# Patient Record
Sex: FEMALE | Race: WHITE | NOT HISPANIC OR LATINO | Employment: OTHER | URBAN - METROPOLITAN AREA
[De-identification: names, ages, dates, MRNs, and addresses within clinical notes are randomized per-mention and may not be internally consistent; named-entity substitution may affect disease eponyms.]

---

## 2018-12-03 ENCOUNTER — OFFICE VISIT (OUTPATIENT)
Dept: DERMATOLOGY | Facility: CLINIC | Age: 58
End: 2018-12-03
Payer: COMMERCIAL

## 2018-12-03 DIAGNOSIS — L82.1 SEBORRHEIC KERATOSIS: ICD-10-CM

## 2018-12-03 DIAGNOSIS — D23.9 DERMATOFIBROMA: Primary | ICD-10-CM

## 2018-12-03 DIAGNOSIS — Z13.89 SCREENING FOR SKIN CONDITION: ICD-10-CM

## 2018-12-03 DIAGNOSIS — D22.9 NEVUS: ICD-10-CM

## 2018-12-03 PROCEDURE — 99203 OFFICE O/P NEW LOW 30 MIN: CPT | Performed by: DERMATOLOGY

## 2018-12-03 RX ORDER — METAXALONE 800 MG/1
TABLET ORAL
COMMUNITY
Start: 2018-05-31

## 2018-12-03 RX ORDER — NYSTATIN 100000 [USP'U]/G
POWDER TOPICAL
COMMUNITY
Start: 2016-10-25

## 2018-12-03 RX ORDER — GABAPENTIN 300 MG/1
CAPSULE ORAL
Refills: 3 | COMMUNITY
Start: 2018-10-18

## 2018-12-03 RX ORDER — FERROUS SULFATE 325(65) MG
325 TABLET ORAL
COMMUNITY

## 2018-12-03 RX ORDER — BUTALBITAL, ACETAMINOPHEN AND CAFFEINE 50; 325; 40 MG/1; MG/1; MG/1
TABLET ORAL
COMMUNITY
Start: 2018-05-22

## 2018-12-03 RX ORDER — LANSOPRAZOLE 30 MG/1
CAPSULE, DELAYED RELEASE ORAL
COMMUNITY
Start: 2014-07-16

## 2018-12-03 RX ORDER — MECLIZINE HYDROCHLORIDE 25 MG/1
25 TABLET ORAL 3 TIMES DAILY PRN
COMMUNITY
Start: 2016-08-28

## 2018-12-03 RX ORDER — LANSOPRAZOLE 30 MG/1
30 CAPSULE, DELAYED RELEASE ORAL 2 TIMES DAILY
Refills: 6 | COMMUNITY
Start: 2018-09-02

## 2018-12-03 RX ORDER — VALSARTAN AND HYDROCHLOROTHIAZIDE 320; 25 MG/1; MG/1
1 TABLET, FILM COATED ORAL
COMMUNITY
Start: 2018-10-11

## 2018-12-03 RX ORDER — VALSARTAN 320 MG/1
TABLET ORAL
COMMUNITY
Start: 2014-07-30

## 2018-12-03 RX ORDER — ALBUTEROL SULFATE 2.5 MG/3ML
SOLUTION RESPIRATORY (INHALATION)
COMMUNITY
Start: 2017-09-14

## 2018-12-03 RX ORDER — DICYCLOMINE HCL 20 MG
20 TABLET ORAL EVERY 6 HOURS
COMMUNITY
Start: 2018-04-05

## 2018-12-03 RX ORDER — TRAMADOL HYDROCHLORIDE 50 MG/1
TABLET ORAL
COMMUNITY
Start: 2018-03-20

## 2018-12-03 RX ORDER — BIOTIN 1 MG
1000 TABLET ORAL
COMMUNITY

## 2018-12-03 RX ORDER — LIDOCAINE 50 MG/G
3 PATCH TOPICAL EVERY 24 HOURS
COMMUNITY
Start: 2018-04-05

## 2018-12-03 NOTE — PROGRESS NOTES
[facility] DERMATOLOGY  [address] [place] [address]  [phone]  [phone]     MRN: [id number] : 1960  Encounter: [id number]  Patient Information: Christie Levy  Chief complaint:  Yearly checkup    History of present illness:  A 65-year-old female presents for overall skin check concerned regarding potential skin cancer with a history of breast cancer patient with the lesion on her leg that is been present for a long time  No past medical history on file  No past surgical history on file  Social History   History   Alcohol use Not on file     History   Drug use: Unknown     History   Smoking Status    Former Smoker   Smokeless Tobacco    Never Used     No family history on file  Meds/Allergies   Allergies   Allergen Reactions    Aspirin      Other reaction(s): ASPIRIN (ASPIRIN) (asthma)    Ciprofloxacin     Diphenhydramine      Asthma attack    Erythromycin     Nsaids     Omeprazole      Other reaction(s): HA    Penicillins     Ranitidine      Other reaction(s): HA, itch, diarrhea    Topiramate      Other reaction(s): SOB    Valproic Acid      Other reaction(s): asthma attack       Meds:  Prior to Admission medications    Medication Sig Start Date End Date Taking?  Authorizing Provider   albuterol (2 5 mg/3 mL) 0 083 % nebulizer solution USE 1 VIA VIAL BY NEBULIZATION EVERY 4 HOURS AS NEEDED FOR WHEEZING 17  Yes Historical Provider, MD   Biotin 1000 MCG tablet Take 1,000 mcg by mouth   Yes Historical Provider, MD   butalbital-acetaminophen-caffeine (FIORICET,ESGIC) -40 mg per tablet TAKE 1 TABLET BY MOUTH EVERY 4 HOURS AS NEEDED FOR HEADACHES 18  Yes Historical Provider, MD   Cholecalciferol 2000 units CAPS Take 1 capsule by mouth   Yes Historical Provider, MD   dicyclomine (BENTYL) 20 mg tablet Take 20 mg by mouth every 6 (six) hours 18  Yes Historical Provider, MD   DOCOSAHEXAENOIC ACID PO Take by mouth   Yes Historical Provider, MD   ferrous sulfate 325 (65 Fe) mg tablet Take 325 mg by mouth   Yes Historical Provider, MD   gabapentin (NEURONTIN) 300 mg capsule TAKE 1 CAPSULE BY MOUTH TWICE A DAY X 3 MONTHS (90D) AS DIRECTED 10/18/18  Yes Historical Provider, MD   lansoprazole (PREVACID) 30 mg capsule Take 30 mg by mouth 2 (two) times a day 9/2/18  Yes Historical Provider, MD   lansoprazole (PREVACID) 30 mg capsule Take by mouth 7/16/14  Yes Historical Provider, MD   lidocaine (LIDODERM) 5 % Place 3 patches on the skin every 24 hours 4/5/18  Yes Historical Provider, MD   meclizine (ANTIVERT) 25 mg tablet Take 25 mg by mouth Three times daily as needed 8/28/16  Yes Historical Provider, MD   metaxalone (SKELAXIN) 800 mg tablet TAKE 1 TABLET (800 MG TOTAL) BY MOUTH 3 (THREE) TIMES A DAY AS NEEDED FOR MUSCLE SPASMS  5/31/18  Yes Historical Provider, MD   nystatin (nystatin) powder APPLY TOPICALLY 3 (THREE) TIMES A DAY   APPLY TO AFFECTED AREA OF ABDOMEN 10/25/16  Yes Historical Provider, MD   traMADol (ULTRAM) 50 mg tablet TAKE 1 TABLET BY MOUTH 3 TIMES A DAY 3/20/18  Yes Historical Provider, MD   valsartan (DIOVAN) 320 MG tablet Take by mouth 7/30/14  Yes Historical Provider, MD   valsartan-hydrochlorothiazide (DIOVAN-HCT) 320-25 MG per tablet Take 1 tablet by mouth 10/11/18  Yes Historical Provider, MD       Subjective:     Review of Systems:    General: negative for - chills, fatigue, fever,  weight gain or weight loss  Psychological: negative for - anxiety, behavioral disorder, concentration difficulties, decreased libido, depression, irritability, memory difficulties, mood swings, sleep disturbances or suicidal ideation  ENT: negative for - hearing difficulties , nasal congestion, nasal discharge, oral lesions, sinus pain, sneezing, sore throat  Allergy and Immunology: negative for - hives, insect bite sensitivity,  Hematological and Lymphatic: negative for - bleeding problems, blood clots,bruising, swollen lymph nodes  Endocrine: negative for - hair pattern changes, hot flashes, malaise/lethargy, mood swings, palpitations, polydipsia/polyuria, skin changes, temperature intolerance or unexpected weight change  Respiratory: negative for - cough, hemoptysis, orthopnea, shortness of breath, or wheezing  Cardiovascular: negative for - chest pain, dyspnea on exertion, edema,  Gastrointestinal: negative for - abdominal pain, nausea/vomiting  Genito-Urinary: negative for - dysuria, incontinence, irregular/heavy menses or urinary frequency/urgency  Musculoskeletal: negative for - gait disturbance, joint pain, joint stiffness, joint swelling, muscle pain, muscular weakness  Dermatological:  As in HPI  Neurological: negative for confusion, dizziness, headaches, impaired coordination/balance, memory loss, numbness/tingling, seizures, speech problems, tremors or weakness       Objective: There were no vitals taken for this visit  Physical Exam:    General Appearance:    Alert, cooperative, no distress   Head:    Normocephalic, without obvious abnormality, atraumatic           Skin:   A full skin exam was performed including scalp, head scalp, eyes, ears, nose, lips, neck, chest, axilla, abdomen, back, buttocks, bilateral upper extremities, bilateral lower extremities, hands, feet, fingers, toes, fingernails, and toenails dome-shaped papule with positive pinch sign right leg normal keratotic papules greasy stuck on appearance normal pigmented lesions regular shape and color nothing else remarkable on exam     Assessment:     1  Dermatofibroma     2  Seborrheic keratosis     3  Nevus     4  Screening for skin condition           Plan:   Dermatofibroma no treatment needed patient reassured these are normal growths we sometimes acquire  Seborrheic Keratosis  Patient reasurred these are normal growths we acquire with age no treatment needed    Nevi reviewed the concept of ABCDE and ugly duckling nothing markedly atypical patient reassured  Screening for Dermatologic Disorders: Nothing else of concern noted on complete exam follow up in 1 year       Imani Loco MD  12/3/2018,2:40 PM    Portions of the record may have been created with voice recognition software   Occasional wrong word or "sound a like" substitutions may have occurred due to the inherent limitations of voice recognition software   Read the chart carefully and recognize, using context, where substitutions have occurred

## 2018-12-03 NOTE — PATIENT INSTRUCTIONS
Dermatofibroma no treatment needed patient reassured these are normal growths we sometimes acquire  Seborrheic Keratosis  Patient reasurred these are normal growths we acquire with age no treatment needed    Nevi reviewed the concept of ABCDE and ric duckling nothing markedly atypical patient reassured  Screening for Dermatologic Disorders: Nothing else of concern noted on complete exam follow up in 1 year

## 2019-12-09 ENCOUNTER — OFFICE VISIT (OUTPATIENT)
Dept: DERMATOLOGY | Facility: CLINIC | Age: 59
End: 2019-12-09
Payer: COMMERCIAL

## 2019-12-09 DIAGNOSIS — Z13.89 SCREENING FOR SKIN CONDITION: ICD-10-CM

## 2019-12-09 DIAGNOSIS — D22.9 NEVUS: Primary | ICD-10-CM

## 2019-12-09 DIAGNOSIS — L82.1 SEBORRHEIC KERATOSIS: ICD-10-CM

## 2019-12-09 PROCEDURE — 99213 OFFICE O/P EST LOW 20 MIN: CPT | Performed by: DERMATOLOGY

## 2019-12-09 NOTE — PROGRESS NOTES
[address]  [facility] [address]  [address] [place] [address]  [phone]  [phone]     MRN: [id number] : 1960  Encounter: [id number]  Patient Information: Steven Hernández  Chief complaint:  Yearly skin check    History of present illness:  79-year-old female presents for overall checkup no specific concerns noted no changes lesions noted  History reviewed  No pertinent past medical history  History reviewed  No pertinent surgical history  Social History   Social History     Substance and Sexual Activity   Alcohol Use Not on file     Social History     Substance and Sexual Activity   Drug Use Not on file     Social History     Tobacco Use   Smoking Status Former Smoker   Smokeless Tobacco Never Used     History reviewed  No pertinent family history  Meds/Allergies   Allergies   Allergen Reactions    Aspirin      Other reaction(s): ASPIRIN (ASPIRIN) (asthma)    Ciprofloxacin     Diphenhydramine      Asthma attack    Erythromycin     Nsaids     Omeprazole      Other reaction(s): HA    Penicillins     Ranitidine      Other reaction(s): HA, itch, diarrhea    Topiramate      Other reaction(s): SOB    Valproic Acid      Other reaction(s): asthma attack       Meds:  Prior to Admission medications    Medication Sig Start Date End Date Taking?  Authorizing Provider   albuterol (2 5 mg/3 mL) 0 083 % nebulizer solution USE 1 VIA VIAL BY NEBULIZATION EVERY 4 HOURS AS NEEDED FOR WHEEZING 17  Yes Historical Provider, MD   Biotin 1000 MCG tablet Take 1,000 mcg by mouth   Yes Historical Provider, MD   butalbital-acetaminophen-caffeine (FIORICET,ESGIC) -40 mg per tablet TAKE 1 TABLET BY MOUTH EVERY 4 HOURS AS NEEDED FOR HEADACHES 18  Yes Historical Provider, MD   Cholecalciferol 2000 units CAPS Take 1 capsule by mouth   Yes Historical Provider, MD   dicyclomine (BENTYL) 20 mg tablet Take 20 mg by mouth every 6 (six) hours 18  Yes Historical Provider, MD   ferrous sulfate 325 (65 Fe) mg tablet Take 325 mg by mouth   Yes Historical Provider, MD   gabapentin (NEURONTIN) 300 mg capsule TAKE 1 CAPSULE BY MOUTH TWICE A DAY X 3 MONTHS (90D) AS DIRECTED 10/18/18  Yes Historical Provider, MD   lansoprazole (PREVACID) 30 mg capsule Take 30 mg by mouth 2 (two) times a day 9/2/18  Yes Historical Provider, MD   lidocaine (LIDODERM) 5 % Place 3 patches on the skin every 24 hours 4/5/18  Yes Historical Provider, MD   meclizine (ANTIVERT) 25 mg tablet Take 25 mg by mouth Three times daily as needed 8/28/16  Yes Historical Provider, MD   metaxalone (SKELAXIN) 800 mg tablet TAKE 1 TABLET (800 MG TOTAL) BY MOUTH 3 (THREE) TIMES A DAY AS NEEDED FOR MUSCLE SPASMS  5/31/18  Yes Historical Provider, MD   nystatin (nystatin) powder APPLY TOPICALLY 3 (THREE) TIMES A DAY   APPLY TO AFFECTED AREA OF ABDOMEN 10/25/16  Yes Historical Provider, MD   valsartan-hydrochlorothiazide (DIOVAN-HCT) 320-25 MG per tablet Take 1 tablet by mouth 10/11/18  Yes Historical Provider, MD   DOCOSAHEXAENOIC ACID PO Take by mouth    Historical Provider, MD   lansoprazole (PREVACID) 30 mg capsule Take by mouth 7/16/14   Historical Provider, MD   traMADol (ULTRAM) 50 mg tablet TAKE 1 TABLET BY MOUTH 3 TIMES A DAY 3/20/18   Historical Provider, MD   valsartan (DIOVAN) 320 MG tablet Take by mouth 7/30/14   Historical Provider, MD       Subjective:     Review of Systems:    General: negative for - chills, fatigue, fever,  weight gain or weight loss  Psychological: negative for - anxiety, behavioral disorder, concentration difficulties, decreased libido, depression, irritability, memory difficulties, mood swings, sleep disturbances or suicidal ideation  ENT: negative for - hearing difficulties , nasal congestion, nasal discharge, oral lesions, sinus pain, sneezing, sore throat  Allergy and Immunology: negative for - hives, insect bite sensitivity,  Hematological and Lymphatic: negative for - bleeding problems, blood clots,bruising, swollen lymph nodes  Endocrine: negative for - hair pattern changes, hot flashes, malaise/lethargy, mood swings, palpitations, polydipsia/polyuria, skin changes, temperature intolerance or unexpected weight change  Respiratory: negative for - cough, hemoptysis, orthopnea, shortness of breath, or wheezing  Cardiovascular: negative for - chest pain, dyspnea on exertion, edema,  Gastrointestinal: negative for - abdominal pain, nausea/vomiting  Genito-Urinary: negative for - dysuria, incontinence, irregular/heavy menses or urinary frequency/urgency  Musculoskeletal: negative for - gait disturbance, joint pain, joint stiffness, joint swelling, muscle pain, muscular weakness  Dermatological:  As in HPI  Neurological: negative for confusion, dizziness, headaches, impaired coordination/balance, memory loss, numbness/tingling, seizures, speech problems, tremors or weakness       Objective: There were no vitals taken for this visit  Physical Exam:    General Appearance:    Alert, cooperative, no distress   Head:    Normocephalic, without obvious abnormality, atraumatic           Skin:   A full skin exam was performed including scalp, head scalp, eyes, ears, nose, lips, neck, chest, axilla, abdomen, back, buttocks, bilateral upper extremities, bilateral lower extremities, hands, feet, fingers, toes, fingernails, and toenails normal pigmented lesion regular shape and color normal keratotic papules greasy stuck appearance nothing else remarkable noted on complete exam     Assessment:     1  Nevus     2  Seborrheic keratosis     3  Screening for skin condition           Plan:   Nevi reviewed the concept of ABCDE and ugly duckling nothing markedly atypical patient reassured  Seborrheic Keratosis  Patient reasurred these are normal growths we acquire with age no treatment needed    Screening for Dermatologic Disorders: Nothing else of concern noted on complete exam follow up in 1 year Rodolfo Garcia MD  12/9/2019,2:52 PM    Portions of the record may have been created with voice recognition software   Occasional wrong word or "sound a like" substitutions may have occurred due to the inherent limitations of voice recognition software   Read the chart carefully and recognize, using context, where substitutions have occurred

## 2021-04-12 ENCOUNTER — OFFICE VISIT (OUTPATIENT)
Dept: DERMATOLOGY | Facility: CLINIC | Age: 61
End: 2021-04-12
Payer: COMMERCIAL

## 2021-04-12 VITALS — TEMPERATURE: 96.4 F

## 2021-04-12 DIAGNOSIS — Z13.89 SCREENING FOR SKIN CONDITION: ICD-10-CM

## 2021-04-12 DIAGNOSIS — L82.1 SEBORRHEIC KERATOSIS: ICD-10-CM

## 2021-04-12 DIAGNOSIS — L71.9 ROSACEA: Primary | ICD-10-CM

## 2021-04-12 DIAGNOSIS — D22.9 NEVUS: ICD-10-CM

## 2021-04-12 PROCEDURE — 99213 OFFICE O/P EST LOW 20 MIN: CPT | Performed by: DERMATOLOGY

## 2021-04-12 RX ORDER — OXYCODONE HYDROCHLORIDE 5 MG/1
5 TABLET ORAL EVERY 6 HOURS PRN
COMMUNITY
Start: 2021-02-25 | End: 2021-06-25

## 2021-04-12 RX ORDER — LETROZOLE 2.5 MG/1
2.5 TABLET, FILM COATED ORAL DAILY
COMMUNITY
Start: 2021-02-19

## 2021-04-12 RX ORDER — GABAPENTIN 100 MG/1
CAPSULE ORAL
COMMUNITY
Start: 2021-03-19

## 2021-04-12 NOTE — PATIENT INSTRUCTIONS
Rosacea by history at this point I cannot confirm that diagnosis advised patient if she is going to use the medication which he was given previously by her family physician she needs to use on a daily basis to control this process  Nevi reviewed the concept of ABCDE and ugly duckling nothing markedly atypical patient reassured  Seborrheic Keratosis  Patient reasurred these are normal growths we acquire with age no treatment needed    Screening for Dermatologic Disorders: Nothing else of concern noted on complete exam follow up in 1 year

## 2021-04-12 NOTE — PROGRESS NOTES
[facility] DERMATOLOGY  [address] [place] [address]  [place] [address]  [phone]  [phone]     MRN: [id number] : 1960  Encounter: [id number]  Patient Information: Melissa Mobley  Chief complaint:   Skin Cancer checkup    History of present illness:  70-year-old female without history of skin cancer presents for overall checkup and concerns patient also is concerned regarding facial rash which she gets redness that seems to last for quite a while not present right now patient's family physician thought she had rosacea  No past medical history on file  No past surgical history on file  Social History   Social History     Substance and Sexual Activity   Alcohol Use None     Social History     Substance and Sexual Activity   Drug Use Not on file     Social History     Tobacco Use   Smoking Status Former Smoker   Smokeless Tobacco Never Used     No family history on file  Meds/Allergies   Allergies   Allergen Reactions    Ibuprofen Shortness Of Breath    Aspirin      Other reaction(s): ASPIRIN (ASPIRIN) (asthma)    Ciprofloxacin     Diphenhydramine      Asthma attack    Erythromycin     Nsaids     Omeprazole      Other reaction(s): HA    Penicillins     Ranitidine      Other reaction(s): HA, itch, diarrhea    Topiramate      Other reaction(s): SOB    Valproic Acid      Other reaction(s): asthma attack       Meds:  Prior to Admission medications    Medication Sig Start Date End Date Taking?  Authorizing Provider   albuterol (2 5 mg/3 mL) 0 083 % nebulizer solution USE 1 VIA VIAL BY NEBULIZATION EVERY 4 HOURS AS NEEDED FOR WHEEZING 17  Yes Historical Provider, MD   Biotin 1000 MCG tablet Take 1,000 mcg by mouth   Yes Historical Provider, MD   butalbital-acetaminophen-caffeine (FIORICET,ESGIC) -40 mg per tablet TAKE 1 TABLET BY MOUTH EVERY 4 HOURS AS NEEDED FOR HEADACHES 18  Yes Historical Provider, MD   Cholecalciferol 2000 units CAPS Take 1 capsule by mouth   Yes Historical Provider, MD   dicyclomine (BENTYL) 20 mg tablet Take 20 mg by mouth every 6 (six) hours 4/5/18  Yes Historical Provider, MD   DOCOSAHEXAENOIC ACID PO Take by mouth   Yes Historical Provider, MD   ferrous sulfate 325 (65 Fe) mg tablet Take 325 mg by mouth   Yes Historical Provider, MD   gabapentin (NEURONTIN) 100 mg capsule TAKE 1 2 CAPSULES AT BEDTIME IN ADDITION TO  S 3/19/21  Yes Historical Provider, MD   gabapentin (NEURONTIN) 300 mg capsule TAKE 1 CAPSULE BY MOUTH TWICE A DAY X 3 MONTHS (90D) AS DIRECTED 10/18/18  Yes Historical Provider, MD   lansoprazole (PREVACID) 30 mg capsule Take by mouth 7/16/14  Yes Historical Provider, MD   letrozole [facility]) 2 5 mg tablet Take 2 5 mg by mouth daily 2/19/21  Yes Historical Provider, MD   lidocaine (LIDODERM) 5 % Place 3 patches on the skin every 24 hours 4/5/18  Yes Historical Provider, MD   meclizine (ANTIVERT) 25 mg tablet Take 25 mg by mouth Three times daily as needed 8/28/16  Yes Historical Provider, MD   metaxalone (SKELAXIN) 800 mg tablet TAKE 1 TABLET (800 MG TOTAL) BY MOUTH 3 (THREE) TIMES A DAY AS NEEDED FOR MUSCLE SPASMS  5/31/18  Yes Historical Provider, MD   nystatin (nystatin) powder APPLY TOPICALLY 3 (THREE) TIMES A DAY   APPLY TO AFFECTED AREA OF ABDOMEN 10/25/16  Yes Historical Provider, MD   oxyCODONE (ROXICODONE) 5 mg immediate release tablet Take 5 mg by mouth every 6 (six) hours as needed 2/25/21 6/25/21 Yes Historical Provider, MD   traMADol (ULTRAM) 50 mg tablet TAKE 1 TABLET BY MOUTH 3 TIMES A DAY 3/20/18  Yes Historical Provider, MD   valsartan (DIOVAN) 320 MG tablet Take by mouth 7/30/14  Yes Historical Provider, MD   valsartan-hydrochlorothiazide (DIOVAN-HCT) 320-25 MG per tablet Take 1 tablet by mouth 10/11/18  Yes Historical Provider, MD   lansoprazole (PREVACID) 30 mg capsule Take 30 mg by mouth 2 (two) times a day 9/2/18   Historical Provider, MD       Subjective:     Review of Systems:    General: negative for - chills, fatigue, fever,  weight gain or weight loss  Psychological: negative for - anxiety, behavioral disorder, concentration difficulties, decreased libido, depression, irritability, memory difficulties, mood swings, sleep disturbances or suicidal ideation  ENT: negative for - hearing difficulties , nasal congestion, nasal discharge, oral lesions, sinus pain, sneezing, sore throat  Allergy and Immunology: negative for - hives, insect bite sensitivity,  Hematological and Lymphatic: negative for - bleeding problems, blood clots,bruising, swollen lymph nodes  Endocrine: negative for - hair pattern changes, hot flashes, malaise/lethargy, mood swings, palpitations, polydipsia/polyuria, skin changes, temperature intolerance or unexpected weight change  Respiratory: negative for - cough, hemoptysis, orthopnea, shortness of breath, or wheezing  Cardiovascular: negative for - chest pain, dyspnea on exertion, edema,  Gastrointestinal: negative for - abdominal pain, nausea/vomiting  Genito-Urinary: negative for - dysuria, incontinence, irregular/heavy menses or urinary frequency/urgency  Musculoskeletal: negative for - gait disturbance, joint pain, joint stiffness, joint swelling, muscle pain, muscular weakness  Dermatological:  As in HPI  Neurological: negative for confusion, dizziness, headaches, impaired coordination/balance, memory loss, numbness/tingling, seizures, speech problems, tremors or weakness       Objective:   Temp (!) 96 4 °F (35 8 °C) (Temporal)     Physical Exam:    General Appearance:    Alert, cooperative, no distress   Head:    Normocephalic, without obvious abnormality, atraumatic           Skin:   A full skin exam was performed including scalp, head scalp, eyes, ears, nose, lips, neck, chest, axilla, abdomen, back, buttocks, bilateral upper extremities, bilateral lower extremities, hands, feet, fingers, toes, fingernails, and toenails no active rosacea noted normal pigmented lesions with regular shape and color and else markedly atypical noted exam normal keratotic papules greasy stuck on appearance     Assessment:     1  Rosacea     2  Nevus     3  Seborrheic keratosis     4  Screening for skin condition           Plan:   Rosacea by history at this point I cannot confirm that diagnosis advised patient if she is going to use the medication which he was given previously by her family physician she needs to use on a daily basis to control this process  Nevi reviewed the concept of ABCDE and ugly duckling nothing markedly atypical patient reassured  Seborrheic Keratosis  Patient reasurred these are normal growths we acquire with age no treatment needed  Screening for Dermatologic Disorders: Nothing else of concern noted on complete exam follow up in 1 year       Chinmay Laura MD  4/12/2021,4:27 PM    Portions of the record may have been created with voice recognition software   Occasional wrong word or "sound a like" substitutions may have occurred due to the inherent limitations of voice recognition software   Read the chart carefully and recognize, using context, where substitutions have occurred

## 2022-08-22 ENCOUNTER — OFFICE VISIT (OUTPATIENT)
Dept: DERMATOLOGY | Facility: CLINIC | Age: 62
End: 2022-08-22
Payer: COMMERCIAL

## 2022-08-22 VITALS — WEIGHT: 165 LBS | BODY MASS INDEX: 32.39 KG/M2 | HEIGHT: 60 IN

## 2022-08-22 DIAGNOSIS — L82.1 SEBORRHEIC KERATOSIS: ICD-10-CM

## 2022-08-22 DIAGNOSIS — L71.9 ROSACEA: Primary | ICD-10-CM

## 2022-08-22 DIAGNOSIS — D22.9 NEVUS: ICD-10-CM

## 2022-08-22 DIAGNOSIS — Z13.89 SCREENING FOR SKIN CONDITION: ICD-10-CM

## 2022-08-22 PROCEDURE — 99213 OFFICE O/P EST LOW 20 MIN: CPT | Performed by: DERMATOLOGY

## 2022-08-22 NOTE — PROGRESS NOTES
[facility] DERMATOLOGY  [address] [place] [address]  Jose Henry [address] [place] [id number]  [phone]     MRN: [id number] : 1960  Encounter: [id number]  Patient Information: Christie Levy  Chief complaint:  Yearly checkup    History of present illness:  56-year-old female presents for overall skin check history of rosacea no specific concerns or changes noted patient has not been treating the rosacea recently has not been a problem no other concerns noted  History reviewed  No pertinent past medical history  History reviewed  No pertinent surgical history  Social History   Social History     Substance and Sexual Activity   Alcohol Use None     Social History     Substance and Sexual Activity   Drug Use Not on file     Social History     Tobacco Use   Smoking Status Former Smoker   Smokeless Tobacco Never Used     History reviewed  No pertinent family history  Meds/Allergies   Allergies   Allergen Reactions    Ibuprofen Shortness Of Breath    Aspirin      Other reaction(s): ASPIRIN (ASPIRIN) (asthma)    Ciprofloxacin     Diphenhydramine      Asthma attack    Erythromycin     Nsaids     Omeprazole      Other reaction(s): HA    Penicillins     Ranitidine      Other reaction(s): HA, itch, diarrhea    Topiramate      Other reaction(s): SOB    Valproic Acid      Other reaction(s): asthma attack       Meds:  Prior to Admission medications    Medication Sig Start Date End Date Taking?  Authorizing Provider   albuterol (2 5 mg/3 mL) 0 083 % nebulizer solution USE 1 VIA VIAL BY NEBULIZATION EVERY 4 HOURS AS NEEDED FOR WHEEZING 17  Yes Historical Provider, MD   Biotin 1000 MCG tablet Take 1,000 mcg by mouth   Yes Historical Provider, MD   butalbital-acetaminophen-caffeine (FIORICET,ESGIC) -40 mg per tablet TAKE 1 TABLET BY MOUTH EVERY 4 HOURS AS NEEDED FOR HEADACHES 18  Yes Historical Provider, MD   Cholecalciferol 2000 units CAPS Take 1 capsule by mouth Yes Historical Provider, MD   dicyclomine (BENTYL) 20 mg tablet Take 20 mg by mouth every 6 (six) hours 4/5/18  Yes Historical Provider, MD   DOCOSAHEXAENOIC ACID PO Take by mouth   Yes Historical Provider, MD   ferrous sulfate 325 (65 Fe) mg tablet Take 325 mg by mouth   Yes Historical Provider, MD   lansoprazole (PREVACID) 30 mg capsule Take 30 mg by mouth 2 (two) times a day 9/2/18  Yes Historical Provider, MD   lansoprazole (PREVACID) 30 mg capsule Take by mouth 7/16/14  Yes Historical Provider, MD   letrozole [facility]) 2 5 mg tablet Take 2 5 mg by mouth daily 2/19/21  Yes Historical Provider, MD   lidocaine (LIDODERM) 5 % Place 3 patches on the skin every 24 hours 4/5/18  Yes Historical Provider, MD   meclizine (ANTIVERT) 25 mg tablet Take 25 mg by mouth Three times daily as needed 8/28/16  Yes Historical Provider, MD   metaxalone (SKELAXIN) 800 mg tablet TAKE 1 TABLET (800 MG TOTAL) BY MOUTH 3 (THREE) TIMES A DAY AS NEEDED FOR MUSCLE SPASMS  5/31/18  Yes Historical Provider, MD   valsartan-hydrochlorothiazide (DIOVAN-HCT) 320-25 MG per tablet Take 1 tablet by mouth 10/11/18  Yes Historical Provider, MD   gabapentin (NEURONTIN) 100 mg capsule TAKE 1 2 CAPSULES AT BEDTIME IN ADDITION TO  S  Patient not taking: Reported on 8/22/2022 3/19/21   Historical Provider, MD   gabapentin (NEURONTIN) 300 mg capsule TAKE 1 CAPSULE BY MOUTH TWICE A DAY X 3 MONTHS (90D) AS DIRECTED  Patient not taking: Reported on 8/22/2022 10/18/18   Historical Provider, MD   nystatin (MYCOSTATIN) powder APPLY TOPICALLY 3 (THREE) TIMES A DAY   APPLY TO AFFECTED AREA OF ABDOMEN  Patient not taking: Reported on 8/22/2022 10/25/16   Historical Provider, MD   traMADol (ULTRAM) 50 mg tablet TAKE 1 TABLET BY MOUTH 3 TIMES A DAY  Patient not taking: Reported on 8/22/2022 3/20/18   Historical Provider, MD   valsartan (DIOVAN) 320 MG tablet Take by mouth  Patient not taking: Reported on 8/22/2022 7/30/14   Historical Provider, MD Subjective:     Review of Systems:    General: negative for - chills, fatigue, fever,  weight gain or weight loss  Psychological: negative for - anxiety, behavioral disorder, concentration difficulties, decreased libido, depression, irritability, memory difficulties, mood swings, sleep disturbances or suicidal ideation  ENT: negative for - hearing difficulties , nasal congestion, nasal discharge, oral lesions, sinus pain, sneezing, sore throat  Allergy and Immunology: negative for - hives, insect bite sensitivity,  Hematological and Lymphatic: negative for - bleeding problems, blood clots,bruising, swollen lymph nodes  Endocrine: negative for - hair pattern changes, hot flashes, malaise/lethargy, mood swings, palpitations, polydipsia/polyuria, skin changes, temperature intolerance or unexpected weight change  Respiratory: negative for - cough, hemoptysis, orthopnea, shortness of breath, or wheezing  Cardiovascular: negative for - chest pain, dyspnea on exertion, edema,  Gastrointestinal: negative for - abdominal pain, nausea/vomiting  Genito-Urinary: negative for - dysuria, incontinence, irregular/heavy menses or urinary frequency/urgency  Musculoskeletal: negative for - gait disturbance, joint pain, joint stiffness, joint swelling, muscle pain, muscular weakness  Dermatological:  As in HPI  Neurological: negative for confusion, dizziness, headaches, impaired coordination/balance, memory loss, numbness/tingling, seizures, speech problems, tremors or weakness       Objective:   Ht 5' (1 524 m)   Wt 74 8 kg (165 lb)   BMI 32 22 kg/m²     Physical Exam:    General Appearance:    Alert, cooperative, no distress   Head:    Normocephalic, without obvious abnormality, atraumatic           Skin:   A full skin exam was performed including scalp, head scalp, eyes, ears, nose, lips, neck, chest, axilla, abdomen, back, buttocks, bilateral upper extremities, bilateral lower extremities, hands, feet, fingers, toes, fingernails, and toenails normal pigmented lesions regular shape color normal keratotic papules greasy stuck appearance nothing else remarkable noted on complete exam     Assessment:     1  Rosacea     2  Nevus     3  Seborrheic keratosis     4  Screening for skin condition           Plan:   Rosacea under good control no treatment needed at this time  Nevi reviewed the concept of ABCDE and ugly duckling nothing markedly atypical patient reassured  Seborrheic Keratosis  Patient reasurred these are normal growths we acquire with age no treatment needed  Screening for Dermatologic Disorders: Nothing else of concern noted on complete exam follow up in 1 year         Rylan Cosby MD  1/08/5138,7:14 PM    Portions of the record may have been created with voice recognition software   Occasional wrong word or "sound a like" substitutions may have occurred due to the inherent limitations of voice recognition software   Read the chart carefully and recognize, using context, where substitutions have occurred

## 2022-08-22 NOTE — PATIENT INSTRUCTIONS
Rosacea under good control no treatment needed at this time  Nevi reviewed the concept of ABCDE and ugly duckling nothing markedly atypical patient reassured  Seborrheic Keratosis  Patient reasurred these are normal growths we acquire with age no treatment needed    Screening for Dermatologic Disorders: Nothing else of concern noted on complete exam follow up in 1 year

## 2023-01-06 RX ORDER — ESCITALOPRAM OXALATE 5 MG/1
5 TABLET ORAL DAILY
COMMUNITY
Start: 2022-07-19

## 2023-01-06 RX ORDER — DULOXETIN HYDROCHLORIDE 30 MG/1
1 CAPSULE, DELAYED RELEASE ORAL 2 TIMES DAILY
COMMUNITY
Start: 2022-11-28

## 2023-01-09 ENCOUNTER — OFFICE VISIT (OUTPATIENT)
Dept: GASTROENTEROLOGY | Facility: CLINIC | Age: 63
End: 2023-01-09

## 2023-01-09 VITALS
HEART RATE: 94 BPM | BODY MASS INDEX: 30.43 KG/M2 | DIASTOLIC BLOOD PRESSURE: 92 MMHG | SYSTOLIC BLOOD PRESSURE: 142 MMHG | OXYGEN SATURATION: 98 % | HEIGHT: 60 IN | WEIGHT: 155 LBS

## 2023-01-09 DIAGNOSIS — K21.9 GASTROESOPHAGEAL REFLUX DISEASE WITHOUT ESOPHAGITIS: Primary | ICD-10-CM

## 2023-01-09 RX ORDER — TIZANIDINE 2 MG/1
TABLET ORAL
COMMUNITY
Start: 2022-11-02

## 2023-01-09 RX ORDER — BENZONATATE 200 MG/1
CAPSULE ORAL
COMMUNITY
Start: 2023-01-03

## 2023-01-09 RX ORDER — PREDNISONE 10 MG/1
TABLET ORAL
COMMUNITY
Start: 2023-01-04

## 2023-01-09 RX ORDER — OXYCODONE HYDROCHLORIDE 5 MG/1
TABLET ORAL
COMMUNITY
Start: 2022-11-02

## 2023-01-09 RX ORDER — CLINDAMYCIN HYDROCHLORIDE 300 MG/1
300 CAPSULE ORAL EVERY 6 HOURS
COMMUNITY
Start: 2022-12-14

## 2023-01-09 RX ORDER — SUCRALFATE 1 G/1
1 TABLET ORAL
COMMUNITY
Start: 2023-01-03

## 2023-01-09 NOTE — LETTER
January 9, 2023     Milla Law, [address]    Patient: Isak Richardson   YOB: 1960   Date of Visit: 1/9/2023       Dear Dr Ena Rhodes: Thank you for referring Naif Savage to me for evaluation  Below are my notes for this consultation  If you have questions, please do not hesitate to call me  I look forward to following your patient along with you  Sincerely,        Kera Carrillo PA-C        CC: MD Kera Bucio PA-C  1/9/2023  1:21 PM  Sign when Signing Visit  [facility] - Outpatient Follow-up Note  Isak Richardson 60 y o  female MRN: [id number]  Encounter: [id number]          ASSESSMENT AND PLAN:      1  Gastroesophageal reflux disease without esophagitis  -Will continue lansoprazole twice a day  -Will continue Zantac nightly and Carafate for the next 6 weeks     -Patient is already reporting improvement     -We will hold off on any endoscopic evaluation at this time  If patient has a return of symptoms she will contact the office     -Patient is due for repeat endoscopic evaluation in 2025   ______________________________________________________________________    SUBJECTIVE:   67-year-old female with a past medical history significant for acid reflux disease presents to the GI clinic today for follow-up  Patient reports that she is here for follow-up of worsening acid reflux disease  Patient reports that she had been given oxycodone that she was taking daily for about 4 months  She reports that she has a history of chronic back pain  She reports that she talk to her doctor about trying to come off this medication and she was weaned off the oxycodone but unfortunately did go through significant withdrawal symptoms pertaining to her gastrointestinal tract  She reports that this flared her acid reflux    She reports excessive amounts of burning and regurgitation  She had a 20 pound weight loss over the past several weeks with decreased appetite  She reports that she did follow-up with her primary care doctor who started her on Zantac nightly and Carafate  She is also maintained on Prevacid twice a day  Patient's last upper endoscopy from 2015 was a normal examination  Patient's last colonoscopy from 2015 was a normal examination  REVIEW OF SYSTEMS IS OTHERWISE NEGATIVE  Historical Information   Past Medical History:   Diagnosis Date   • Brain aneurysm    • [facility] ([facility] )    • [facility]     breast     Past Surgical History:   Procedure Laterality Date   • BRAIN SURGERY     • BREAST SURGERY       Social History   Social History     Substance and Sexual Activity   Alcohol Use Not Currently     Social History     Substance and Sexual Activity   Drug Use Yes   • Types: Marijuana    Comment: medical marijuana     Social History     Tobacco Use   Smoking Status Former   Smokeless Tobacco Never     History reviewed  No pertinent family history      Meds/Allergies        Current Outpatient Medications:   •  albuterol (2 5 mg/3 mL) 0 083 % nebulizer solution  •  benzonatate (TESSALON) 200 MG capsule  •  Biotin 1000 MCG tablet  •  butalbital-acetaminophen-caffeine (FIORICET,ESGIC) -40 mg per tablet  •  Cholecalciferol 2000 units CAPS  •  dicyclomine (BENTYL) 20 mg tablet  •  DOCOSAHEXAENOIC ACID PO  •  DULoxetine (CYMBALTA) 30 mg delayed release capsule  •  escitalopram (LEXAPRO) 5 mg tablet  •  ferrous sulfate 325 (65 Fe) mg tablet  •  lansoprazole (PREVACID) 30 mg capsule  •  lansoprazole (PREVACID) 30 mg capsule  •  letrozole (FEMARA) 2 5 mg tablet  •  lidocaine (LIDODERM) 5 %  •  meclizine (ANTIVERT) 25 mg tablet  •  metaxalone (SKELAXIN) 800 mg tablet  •  nystatin (MYCOSTATIN) powder  •  predniSONE 10 mg tablet  •  sucralfate (CARAFATE) 1 g tablet  •  tiZANidine (ZANAFLEX) 2 mg tablet  •  valsartan-hydrochlorothiazide (DIOVAN-HCT) 320-25 MG per tablet  •  clindamycin (CLEOCIN) 300 MG capsule  •  gabapentin (NEURONTIN) 100 mg capsule  •  gabapentin (NEURONTIN) 300 mg capsule  •  oxyCODONE (ROXICODONE) 5 immediate release tablet  •  traMADol (ULTRAM) 50 mg tablet  •  valsartan (DIOVAN) 320 MG tablet    Allergies   Allergen Reactions   • Ibuprofen Shortness Of Breath   • Aspirin      Other reaction(s): ASPIRIN (ASPIRIN) (asthma)   • Ciprofloxacin    • Diphenhydramine      Asthma attack   • Erythromycin    • Nsaids    • Omeprazole      Other reaction(s): HA   • Other Other (See Comments)   • Penicillins    • Ranitidine      Other reaction(s): HA, itch, diarrhea   • Topiramate      Other reaction(s): SOB   • Valproic Acid      Other reaction(s): asthma attack           Objective      Blood pressure 142/92, pulse 94, height 5' (1 524 m), weight 70 3 kg (155 lb), SpO2 98 %  Body mass index is 30 27 kg/m²  PHYSICAL EXAM:      General Appearance:   Alert, cooperative, no distress   HEENT:   Normocephalic, atraumatic, anicteric      Neck:  Supple, symmetrical, trachea midline   Lungs:   Clear to auscultation bilaterally; no rales, rhonchi or wheezing; respirations unlabored    Heart[de-identified]   Regular rate and rhythm; no murmur, rub, or gallop  Abdomen:   Soft, non-tender, non-distended; normal bowel sounds; no masses, no organomegaly    Genitalia:   Deferred    Rectal:   Deferred    Extremities:  No cyanosis, clubbing or edema    Pulses:  2+ and symmetric    Skin:  No jaundice, rashes, or lesions    Lymph nodes:  No palpable cervical lymphadenopathy        Lab Results:   No visits with results within 1 Day(s) from this visit  Latest known visit with results is:   No results found for any previous visit  Radiology Results:   No results found

## 2023-01-09 NOTE — PROGRESS NOTES
[facility] - Outpatient Follow-up Note  Cynda Leak 60 y o  female MRN: [id number]  Encounter: [id number]          ASSESSMENT AND PLAN:      1  Gastroesophageal reflux disease without esophagitis  -Will continue lansoprazole twice a day  -Will continue Zantac nightly and Carafate for the next 6 weeks     -Patient is already reporting improvement     -We will hold off on any endoscopic evaluation at this time  If patient has a return of symptoms she will contact the office     -Patient is due for repeat endoscopic evaluation in 2025   ______________________________________________________________________    SUBJECTIVE:   23-year-old female with a past medical history significant for acid reflux disease presents to the GI clinic today for follow-up  Patient reports that she is here for follow-up of worsening acid reflux disease  Patient reports that she had been given oxycodone that she was taking daily for about 4 months  She reports that she has a history of chronic back pain  She reports that she talk to her doctor about trying to come off this medication and she was weaned off the oxycodone but unfortunately did go through significant withdrawal symptoms pertaining to her gastrointestinal tract  She reports that this flared her acid reflux  She reports excessive amounts of burning and regurgitation  She had a 20 pound weight loss over the past several weeks with decreased appetite  She reports that she did follow-up with her primary care doctor who started her on Zantac nightly and Carafate  She is also maintained on Prevacid twice a day  Patient's last upper endoscopy from 2015 was a normal examination  Patient's last colonoscopy from 2015 was a normal examination  REVIEW OF SYSTEMS IS OTHERWISE NEGATIVE        Historical Information   Past Medical History:   Diagnosis Date   • Brain aneurysm    • Cancer [facility])    • Cancer ([facility] )     breast     Past Surgical History:   Procedure Laterality Date   • BRAIN SURGERY     • BREAST SURGERY       Social History   Social History     Substance and Sexual Activity   Alcohol Use Not Currently     Social History     Substance and Sexual Activity   Drug Use Yes   • Types: Marijuana    Comment: medical marijuana     Social History     Tobacco Use   Smoking Status Former   Smokeless Tobacco Never     History reviewed  No pertinent family history      Meds/Allergies       Current Outpatient Medications:   •  albuterol (2 5 mg/3 mL) 0 083 % nebulizer solution  •  benzonatate (TESSALON) 200 MG capsule  •  Biotin 1000 MCG tablet  •  butalbital-acetaminophen-caffeine (FIORICET,ESGIC) -40 mg per tablet  •  Cholecalciferol 2000 units CAPS  •  dicyclomine (BENTYL) 20 mg tablet  •  DOCOSAHEXAENOIC ACID PO  •  DULoxetine (CYMBALTA) 30 mg delayed release capsule  •  escitalopram (LEXAPRO) 5 mg tablet  •  ferrous sulfate 325 (65 Fe) mg tablet  •  lansoprazole (PREVACID) 30 mg capsule  •  lansoprazole (PREVACID) 30 mg capsule  •  letrozole (FEMARA) 2 5 mg tablet  •  lidocaine (LIDODERM) 5 %  •  meclizine (ANTIVERT) 25 mg tablet  •  metaxalone (SKELAXIN) 800 mg tablet  •  nystatin (MYCOSTATIN) powder  •  predniSONE 10 mg tablet  •  sucralfate (CARAFATE) 1 g tablet  •  tiZANidine (ZANAFLEX) 2 mg tablet  •  valsartan-hydrochlorothiazide (DIOVAN-HCT) 320-25 MG per tablet  •  clindamycin (CLEOCIN) 300 MG capsule  •  gabapentin (NEURONTIN) 100 mg capsule  •  gabapentin (NEURONTIN) 300 mg capsule  •  oxyCODONE (ROXICODONE) 5 immediate release tablet  •  traMADol (ULTRAM) 50 mg tablet  •  valsartan (DIOVAN) 320 MG tablet    Allergies   Allergen Reactions   • Ibuprofen Shortness Of Breath   • Aspirin      Other reaction(s): ASPIRIN (ASPIRIN) (asthma)   • Ciprofloxacin    • Diphenhydramine      Asthma attack   • Erythromycin    • Nsaids    • Omeprazole      Other reaction(s): HA   • Other Other (See Comments)   • Penicillins    • Ranitidine Other reaction(s): HA, itch, diarrhea   • Topiramate      Other reaction(s): SOB   • Valproic Acid      Other reaction(s): asthma attack           Objective     Blood pressure 142/92, pulse 94, height 5' (1 524 m), weight 70 3 kg (155 lb), SpO2 98 %  Body mass index is 30 27 kg/m²  PHYSICAL EXAM:      General Appearance:   Alert, cooperative, no distress   HEENT:   Normocephalic, atraumatic, anicteric      Neck:  Supple, symmetrical, trachea midline   Lungs:   Clear to auscultation bilaterally; no rales, rhonchi or wheezing; respirations unlabored    Heart[de-identified]   Regular rate and rhythm; no murmur, rub, or gallop  Abdomen:   Soft, non-tender, non-distended; normal bowel sounds; no masses, no organomegaly    Genitalia:   Deferred    Rectal:   Deferred    Extremities:  No cyanosis, clubbing or edema    Pulses:  2+ and symmetric    Skin:  No jaundice, rashes, or lesions    Lymph nodes:  No palpable cervical lymphadenopathy        Lab Results:   No visits with results within 1 Day(s) from this visit  Latest known visit with results is:   No results found for any previous visit  Radiology Results:   No results found

## 2024-04-25 ENCOUNTER — OFFICE VISIT (OUTPATIENT)
Age: 64
End: 2024-04-25
Payer: COMMERCIAL

## 2024-04-25 VITALS — BODY MASS INDEX: 27.48 KG/M2 | WEIGHT: 140 LBS | HEIGHT: 60 IN

## 2024-04-25 DIAGNOSIS — D22.9 MULTIPLE MELANOCYTIC NEVI: Primary | ICD-10-CM

## 2024-04-25 DIAGNOSIS — L82.1 SEBORRHEIC KERATOSIS: ICD-10-CM

## 2024-04-25 DIAGNOSIS — D18.01 CHERRY ANGIOMA: ICD-10-CM

## 2024-04-25 DIAGNOSIS — L72.0 MILIA: ICD-10-CM

## 2024-04-25 PROCEDURE — 99213 OFFICE O/P EST LOW 20 MIN: CPT | Performed by: DERMATOLOGY

## 2024-04-25 RX ORDER — CYANOCOBALAMIN (VITAMIN B-12) 1000 MCG
100 TABLET ORAL DAILY
COMMUNITY
Start: 2024-01-22

## 2024-04-25 RX ORDER — PREDNISONE 20 MG/1
TABLET ORAL
COMMUNITY
Start: 2024-04-22

## 2024-04-25 RX ORDER — LIDOCAINE AND PRILOCAINE 25; 25 MG/G; MG/G
CREAM TOPICAL
COMMUNITY
Start: 2024-01-10 | End: 2025-01-09

## 2024-04-25 RX ORDER — PROCHLORPERAZINE MALEATE 10 MG
TABLET ORAL
COMMUNITY
Start: 2024-01-20

## 2024-04-25 RX ORDER — OXYCODONE HYDROCHLORIDE 10 MG/1
TABLET ORAL
COMMUNITY
Start: 2024-03-29

## 2024-04-25 NOTE — PROGRESS NOTES
"[facility]atology [facility] Note     Patient Name: Elena Duarte  Encounter Date: 4/25/2024    Have you been cared for by a [facility] Dermatologist in the last 3 years and, if so, which description applies to you?    Yes.  I have been here within the last 3 years, and my medical history has NOT changed since that time.  I am FEMALE/of child-bearing potential.    REVIEW OF SYSTEMS:  Have you recently had or currently have any of the following? No changes in my recent health.   PAST MEDICAL HISTORY:  Have you personally ever had or currently have any of the following?  If \"YES,\" then please provide more detail. No changes in my medical history.   HISTORY OF IMMUNOSUPPRESSION: Do you have a history of any of the following:  Systemic Immunosuppression such as Diabetes, Biologic or Immunotherapy, Chemotherapy, Organ Transplantation, Bone Marrow Transplantation?  YES, Chemotherapy uterus and lung      Answering \"YES\" requires the addition of the dotphrase \"IMMUNOSUPPRESSED\" as the first diagnosis of the patient's visit.   FAMILY HISTORY:  Any \"first degree relatives\" (parent, brother, sister, or child) with the following?    No changes in my family's known health.   PATIENT EXPERIENCE:    Do you want the Dermatologist to perform a COMPLETE skin exam today including a clinical examination under the \"bra and underwear\" areas?  Yes  If necessary, do we have your permission to call and leave a detailed message on your Preferred Phone number that includes your specific medical information?  Yes      Allergies   Allergen Reactions    Duloxetine Other (See Comments)     Asthma attack    Ibuprofen Shortness Of Breath    Sulfa Antibiotics Other (See Comments)     Asthma attack    Aspirin      Other reaction(s): ASPIRIN (ASPIRIN) (asthma)    Ciprofloxacin     Diphenhydramine      Asthma attack    Erythromycin     Nsaids     Omeprazole      Other reaction(s): HA    Other Other (See Comments)    Penicillins     Ranitidine  "     Other reaction(s): HA, itch, diarrhea    Topiramate      Other reaction(s): SOB    Valproic Acid      Other reaction(s): asthma attack      Current Outpatient Medications:     albuterol (2.5 mg/3 mL) 0.083 % nebulizer solution, USE 1 VIA VIAL BY NEBULIZATION EVERY 4 HOURS AS NEEDED FOR WHEEZING, Disp: , Rfl:     Biotin 1000 MCG tablet, Take 1,000 mcg by mouth, Disp: , Rfl:     butalbital-acetaminophen-caffeine (FIORICET,ESGIC) -40 mg per tablet, TAKE 1 TABLET BY MOUTH EVERY 4 HOURS AS NEEDED FOR HEADACHES, Disp: , Rfl:     Cholecalciferol 2000 units CAPS, Take 1 capsule by mouth, Disp: , Rfl:     Cyanocobalamin (B-12) 1000 MCG TABS, Take 100 mcg by mouth daily, Disp: , Rfl:     dicyclomine (BENTYL) 20 mg tablet, Take 20 mg by mouth every 6 (six) hours, Disp: , Rfl:     ferrous sulfate 325 (65 Fe) mg tablet, Take 325 mg by mouth, Disp: , Rfl:     lansoprazole (PREVACID) 30 mg capsule, Take 30 mg by mouth 2 (two) times a day, Disp: , Rfl: 6    letrozole (FEMARA) 2.5 mg tablet, Take 2.5 mg by mouth daily, Disp: , Rfl:     lidocaine (LIDODERM) 5 %, Place 3 patches on the skin every 24 hours, Disp: , Rfl:     lidocaine-prilocaine (EMLA) cream, Apply topically, Disp: , Rfl:     meclizine (ANTIVERT) 25 mg tablet, Take 25 mg by mouth Three times daily as needed, Disp: , Rfl:     metaxalone (SKELAXIN) 800 mg tablet, TAKE 1 TABLET (800 MG TOTAL) BY MOUTH 3 (THREE) TIMES A DAY AS NEEDED FOR MUSCLE SPASMS., Disp: , Rfl:     oxyCODONE (ROXICODONE) 10 MG TABS, , Disp: , Rfl:     predniSONE 20 mg tablet, , Disp: , Rfl:     prochlorperazine (COMPAZINE) 10 mg tablet, , Disp: , Rfl:     valsartan-hydrochlorothiazide (DIOVAN-HCT) 320-25 MG per tablet, Take 1 tablet by mouth, Disp: , Rfl:     benzonatate (TESSALON) 200 MG capsule, TAKE 1 CAPSULE BY MOUTH THREE TIMES A DAY AS NEEDED FOR COUGH (Patient not taking: Reported on 4/25/2024), Disp: , Rfl:     clindamycin (CLEOCIN) 300 MG capsule, Take 300 mg by mouth every 6 (six)  "hours (Patient not taking: Reported on 1/9/2023), Disp: , Rfl:     DOCOSAHEXAENOIC ACID PO, Take by mouth (Patient not taking: Reported on 4/25/2024), Disp: , Rfl:     escitalopram (LEXAPRO) 5 mg tablet, Take 5 mg by mouth daily (Patient not taking: Reported on 4/25/2024), Disp: , Rfl:     gabapentin (NEURONTIN) 100 mg capsule, TAKE 1 2 CAPSULES AT BEDTIME IN ADDITION TO  S (Patient not taking: Reported on 8/22/2022), Disp: , Rfl:     gabapentin (NEURONTIN) 300 mg capsule, TAKE 1 CAPSULE BY MOUTH TWICE A DAY X 3 MONTHS (90D) AS DIRECTED (Patient not taking: Reported on 8/22/2022), Disp: , Rfl: 3    lansoprazole (PREVACID) 30 mg capsule, Take by mouth (Patient not taking: Reported on 4/25/2024), Disp: , Rfl:     nystatin (MYCOSTATIN) powder, , Disp: , Rfl:     oxyCODONE (ROXICODONE) 5 immediate release tablet, , Disp: , Rfl:     predniSONE 10 mg tablet, TAKE 2 TABLETS BY MOUTH TWICE A DAY X 3 DAYS THEN 1 TAB TWICE A DAY X 3 DAYS THEN 1 TAB DAILY (Patient not taking: Reported on 4/25/2024), Disp: , Rfl:     sucralfate (CARAFATE) 1 g tablet, Take 1 g by mouth 4 (four) times a day (before meals and at bedtime) Take on an empty stomach 1 hour before meals (Patient not taking: Reported on 4/25/2024), Disp: , Rfl:     tiZANidine (ZANAFLEX) 2 mg tablet, , Disp: , Rfl:     traMADol (ULTRAM) 50 mg tablet, TAKE 1 TABLET BY MOUTH 3 TIMES A DAY (Patient not taking: Reported on 8/22/2022), Disp: , Rfl:     valsartan (DIOVAN) 320 MG tablet, Take by mouth (Patient not taking: Reported on 8/22/2022), Disp: , Rfl:           Whom besides the patient is providing clinical information about today's encounter?   NO ADDITIONAL HISTORIAN (patient alone provided history)    Physical Exam and Assessment/Plan by Diagnosis:  CHIEF COMPLAINT    63 year old male or female patient presents today for Yearly Follow Up.  Patient has a No history of skin cancer, patient has concerns on eyelids.     MELANOCYTIC NEVI (\"Moles\")    Physical " "Exam:  Anatomic Location Affected: Mostly on sun-exposed areas of the Trunk and extremities   Morphological Description:  Scattered, 1-4mm round to ovoid, symmetrical-appearing, even bordered, skin colored to dark brown macules/papules, mostly in sun-exposed areas  Pertinent Positives:  Pertinent Negatives:    Additional History of Present Condition:  Present on exam     Assessment and Plan:  Based on a thorough discussion of this condition and the management approach to it (including a comprehensive discussion of the known risks, side effects and potential benefits of treatment), the patient (family) agrees to implement the following specific plan:  Provided handout with information regarding the ABCDE's of moles   Recommend routine skin exams every year   Sun avoidance, protective clothing (known as UPF clothing), and the use of at least SPF 30 sunscreens is advised. Sunscreen should be reapplied every two hours when outside.       SEBORRHEIC KERATOSIS; NON-INFLAMED    Physical Exam:  Anatomic Location Affected:  scattered across trunk, extremities,  face  Morphological Description:  Flat and raised, waxy, smooth to warty textured, yellow to brownish-grey to dark brown to blackish, discrete, \"stuck-on\" appearing papules.  Pertinent Positives:  Pertinent Negatives:    Additional History of Present Condition:  Patient reports new bumps on the skin.  Denies itch, burn, pain, bleeding or ulceration.  Present constantly; nothing seems to make it worse or better.  No prior treatment.      Assessment and Plan:  Based on a thorough discussion of this condition and the management approach to it (including a comprehensive discussion of the known risks, side effects and potential benefits of treatment), the patient (family) agrees to implement the following specific plan:  Reassured benign        ANGIOMA (\"CHERRY ANGIOMA\")    Physical Exam:  Anatomic Location: scattered across sun exposed areas of the trunk and extremities "   Morphologic Description: Firm red to reddish-blue discrete papules  Pertinent Positives:  Pertinent Negatives:    Additional History of Present Condition:  Present on exam.    Assessment and Plan:  Reassured benign       MILIUM     Physical Exam:  Anatomic Location Affected:  Eyelids   Morphological Description:  white papules   Pertinent Positives:  Pertinent Negatives:    Additional History of Present Condition:  Patient concerned about bumps on eyelids     Assessment and Plan:  Based on a thorough discussion of this condition and the management approach to it (including a comprehensive discussion of the known risks, side effects and potential benefits of treatment), the patient (family) agrees to implement the following specific plan:  Reassured benign     Assessment and Plan  A milium is a small cyst containing keratin (the skin protein); they are usually multiple and are then known as milia. These harmless cysts present as tiny pearly-white bumps just under the surface of the skin.  Milia are common in all ages and both sexes. They most often arise on the face and are particularly prominent on the eyelids and cheeks, but they may occur elsewhere.  There are various kinds of milia.   milia: Affect 40-50% of  babies, few to numerous lesions, often seen on the nose, but may also arise inside the mouth on the mucosa (Shahbaz pearls) or palate (Willy nodules) or more widely on the scalp, face and upper trunk, Heal spontaneously within a few weeks of birth.  Primary milia in children and adults: found around eyelids, cheeks, forehead and genitalia, in young children, a row of milia may appear along the nasal crease, may clear in a few weeks or persist for months or longer.    Juvenile milia: associated with Rombo syndrome, basal cell naevus syndrome, Colgt-Mwtki-Ilwovrnj syndrome, pachyonychia congenita, Hunt syndrome and other genetic disorders, may be congenital (present at birth) or appear later  in life.  Milia en plaque: multiple milia appear on within an inflamed plaque up to several centimeters in diameter, usually found on an eyelid, behind the ear, on a cheek or jaw.  3. Affect children and adults, especially middle-aged women.  4. Sometimes associated with another skin disease including pseudoxanthoma elasticum, discoid lupus erythematosus, lichen planus.  Multiple eruptive milia: crops of numerous milia appear over a few weeks to months, lesions may be asymptomatic or itchy, most often affect the face, upper arms and upper trunk.  Traumatic milia: occur at the site of injury as skin heals, arise from eccrine sweat ducts, examples include thermal burns, dermabrasion, blistering rashes such as bullous pemphigoid, often seen on the back of hands and fingers in porphyria cutanea tarda, a milia-like calcified nodule may develop after  heel stick blood test.   Milia associated with drugs: may rarely follow the use of topical medication, such as phenols, hydroquinone, 5-fluorouracil cream, and a corticosteroid.    Milia have a characteristic appearance. However, on occasion, a skin biopsy may be performed. This shows a small epidermoid cyst coming from a vellus hair follicle.  Milia should be distinguished from other types of cyst, comedones, xanthelasma and syringomas. Colloid milia are warern coloured bumps on cheeks and temples associated with excessive exposure to sunlight.  They should also be distinguished from milia-like cysts noted on dermoscopy in seborrhoeic keratoses, papillomatous moles and some basal cell carcinomas.  Milia do not need to be treated unless they are a cause for concern for the patient. They often clear up by themselves within a few months. Where possible, further trauma should be minimised to reduce the development of new lesions.  The lesion may be de-roofed using a sterile needle or blade and the contents squeezed or pricked out.  They may be destroyed using diathermy  and curettage, or cryotherapy.  For widespread lesions, topical retinoids may be helpful.  Chemical peels, dermabrasion and laser ablation have been reported to be effective when used for very extensive milia.  Milia en plaque may improve with minocycline (a tetracycline antibiotic).   Scribe Attestation      I,:  Charlotte Mello MA am acting as a scribe while in the presence of the attending physician.:       I,:  Yogesh Andino MD personally performed the services described in this documentation    as scribed in my presence.:

## 2024-04-25 NOTE — PATIENT INSTRUCTIONS
"MELANOCYTIC NEVI (\"Moles\")      Melanocytic nevi (\"moles\") are tan or brown, raised or flat areas of the skin which have an increased number of melanocytes. Melanocytes are the cells in our body which make pigment and account for skin color.    Some moles are present at birth (I.e., \"congenital nevi\"), while others come up later in life (i.e., \"acquired nevi\").  The sun can stimulate the body to make more moles.  Sunburns are not the only thing that triggers more moles.  Chronic sun exposure can do it too.     Clinically distinguishing a healthy mole from melanoma may be difficult, even for experienced dermatologists. The \"ABCDE's\" of moles have been suggested as a means of helping to alert a person to a suspicious mole and the possible increased risk of melanoma.  The suggestions for raising alert are as follows:    Asymmetry: Healthy moles tend to be symmetric, while melanomas are often asymmetric.  Asymmetry means if you draw a line through the mole, the two halves do not match in color, size, shape, or surface texture. Asymmetry can be a result of rapid enlargement of a mole, the development of a raised area on a previously flat lesion, scaling, ulceration, bleeding or scabbing within the mole.  Any mole that starts to demonstrate \"asymmetry\" should be examined promptly by a board certified dermatologist.     Border: Healthy moles tend to have discrete, even borders.  The border of a melanoma often blends into the normal skin and does not sharply delineate the mole from normal skin.  Any mole that starts to demonstrate \"uneven borders\" should be examined promptly by a board certified dermatologist.     Color: Healthy moles tend to be one color throughout.  Melanomas tend to be made up of different colors ranging from dark black, blue, white, or red.  Any mole that demonstrates a color change should be examined promptly by a board certified dermatologist.     Diameter: Healthy moles tend to be smaller than 0.6 cm " "in size; an exception are \"congenital nevi\" that can be larger.  Melanomas tend to grow and can often be greater than 0.6 cm (1/4 of an inch, or the size of a pencil eraser). This is only a guideline, and many normal moles may be larger than 0.6 cm without being unhealthy.  Any mole that starts to change in size (small to bigger or bigger to smaller) should be examined promptly by a board certified dermatologist.     Evolving: Healthy moles tend to \"stay the same.\"  Melanomas may often show signs of change or evolution such as a change in size, shape, color, or elevation.  Any mole that starts to itch, bleed, crust, burn, hurt, or ulcerate or demonstrate a change or evolution should be examined promptly by a board certified dermatologist.      Dysplastic Nevi  Dysplastic moles are moles that fit the ABCDE rules of melanoma but are not identified as melanomas when examined under the microscope.  They may indicate an increased risk of melanoma in that person. If there is a family history of melanoma, most experts agree that the person may be at an increased risk for developing a melanoma.  Experts still do not agree on what dysplastic moles mean in patients without a personal or family history of melanoma.  Dysplastic moles are usually larger than common moles and have different colors within it with irregular borders. The appearance can be very similar to a melanoma. Biopsies of dysplastic moles may show abnormalities which are different from a regular mole.      Melanoma  Malignant melanoma is a type of skin cancer that can be deadly if it spreads throughout the body. The incidence of melanoma in the [place] is growing faster than any other cancer. Melanoma usually grows near the surface of the skin for a period of time, and then begins to grow deeper into the skin. Once it grows deeper into the skin, the risk of spread to other organs greatly increases. Therefore, early detection and removal of a malignant " "melanoma may result in a better chance at a complete cure; removal after the tumor has spread may not be as effective, leading to worse clinical outcomes such as death.    The true rate of nevus transformation into a melanoma is unknown. It has been estimated that the lifetime risk for any acquired melanocytic nevus on any 20-year-old individual transforming into melanoma by age 80 is 0.03% (1 in 3,164) for men and 0.009% (1 in 10,800) for women.     The appearance of a \"new mole\" remains one of the most reliable methods for identifying a malignant melanoma.  Occasionally, melanomas appear as rapidly growing, blue-black, dome-shaped bumps within a previous mole or previous area of normal skin.  Other times, melanomas are suspected when a mole suddenly appears or changes. Itching, burning, or pain in a pigmented lesion should increase suspicion, but most patients with early melanoma have no skin discomfort whatsoever.  Melanoma can occur anywhere on the skin, including areas that are difficult for self-examination. Many melanomas are first noticed by other family members.  Suspicious-looking moles may be removed for microscopic examination.       You may be able to prevent death from melanoma by doing two simple things:    Try to avoid unnecessary sun exposure and protect your skin when it is exposed to the sun.  People who live near the equator, people who have intermittent exposures to large amounts of sun, and people who have had sunburns in childhood or adolescence have an increased risk for melanoma. Sun sense and vigilant sun protection may be keys to helping to prevent melanoma.  We recommend wearing UPF-rated sun protective clothing and sunglasses whenever possible and applying a moisturizer-sunscreen combination product (SPF 50+) such as Neutrogena Daily Defense to sun exposed areas of skin at least three times a day.    Have your moles regularly examined by a board certified dermatologist AND by yourself or " "a family member/friend at home.  We recommend that you have your moles examined at least once a year by a board certified dermatologist.  Use your birthday as an annual reminder to have your \"Birthday Suit\" (I.e., your skin) examined; it is a nice birthday gift to yourself to know that your skin is healthy appearing!  Additionally, at-home self examinations may be helpful for detecting a possible melanoma.  Use the ABCDEs we discussed and check your moles once a month at home.        SEBORRHEIC KERATOSIS  A seborrheic keratosis is a harmless warty spot that appears during adult life as a common sign of skin aging.  Seborrheic keratoses can arise on any area of skin, covered or uncovered, with the usual exception of the palms and soles. They do not arise from mucous membranes. Seborrheic keratoses can have highly variable appearance.      Seborrheic keratoses are extremely common. It has been estimated that over 90% of adults over the age of 60 years have one or more of them. They occur in males and females of all races, typically beginning to erupt in the 30s or 40s. They are uncommon under the age of 20 years.  The precise cause of seborrhoeic keratoses is not known.  Seborrhoeic keratoses are considered degenerative in nature. As time goes by, seborrheic keratoses tend to become more numerous. Some people inherit a tendency to develop a very large number of them; some people may have hundreds of them.    The name \"seborrheic keratosis\" is misleading, because these lesions are not limited to a seborrhoeic distribution (scalp, mid-face, chest, upper back), nor are they formed from sebaceous glands, nor are they associated with sebum -- which is greasy.  Seborrheic keratosis may also be called \"SK,\" \"Seb K,\" \"basal cell papilloma,\" \"senile wart,\" or \"barnacle.\"      There is no easy way to remove multiple lesions on a single occasion.  Unless a specific lesion is \"inflamed\" and is causing pain or stinging/burning or " "is bleeding, most insurance companies do not authorize treatment.      ANGIOMA (\"CHERRY ANGIOMA\")  Glaser angiomas markedly increase in number from about the age of 40, so it has been estimated that 75% of people over 75 years of age have them. Although they also called \"senile angiomas,\" they can occur in young people too - 5% of adolescents have been found to have them.     Cherry angiomas are very common in males and females of any age or race, with no difference in sexes or races affected. They are however more noticeable in white skin than in skin of colour.  There may be a family history of similar lesions. Eruptive (very large number appearing in a short period of time) cherry angiomas have been rarely reported to be associated with internal malignancy and pregnancy.   "